# Patient Record
(demographics unavailable — no encounter records)

---

## 2024-12-30 NOTE — HISTORY OF PRESENT ILLNESS
[FreeTextEntry1] : Cesia is here for the F/U. she says she has been doing very well. denies experiencing any events suggestive of Sz. Her sleep and mood are good No change in her memory Did not see her PMD for more than a year and also did not do blood test She gained few pounds No spinal and joint pain She continues to work three days in person and two days from home she did do a blood test on the 2th The level is not back. The CMP including the Na is normal she stopped taking her cholesterol med

## 2024-12-30 NOTE — ASSESSMENT
[FreeTextEntry1] : Partial epilepsy ( right hemispheric) DLD  plan check the pending level F/U with the PMD and to monitor the Lipid profile F/U  9-12 months

## 2024-12-30 NOTE — PHYSICAL EXAM
[FreeTextEntry1] : A/A/Ox 3 follows 4 step commands normal language  slightly slow with the word retrieval  CN--- normal no tremor No drift No dysmetria stable gait .